# Patient Record
Sex: FEMALE | Race: BLACK OR AFRICAN AMERICAN | NOT HISPANIC OR LATINO | Employment: UNEMPLOYED | URBAN - METROPOLITAN AREA
[De-identification: names, ages, dates, MRNs, and addresses within clinical notes are randomized per-mention and may not be internally consistent; named-entity substitution may affect disease eponyms.]

---

## 2023-02-08 ENCOUNTER — HOSPITAL ENCOUNTER (EMERGENCY)
Facility: CLINIC | Age: 23
Discharge: HOME | End: 2023-02-08
Attending: EMERGENCY MEDICINE | Admitting: EMERGENCY MEDICINE
Payer: COMMERCIAL

## 2023-02-08 ENCOUNTER — NURSE TRIAGE (OUTPATIENT)
Dept: NURSING | Facility: CLINIC | Age: 23
End: 2023-02-08
Payer: COMMERCIAL

## 2023-02-08 VITALS
TEMPERATURE: 98 F | RESPIRATION RATE: 18 BRPM | HEIGHT: 61 IN | DIASTOLIC BLOOD PRESSURE: 56 MMHG | HEART RATE: 68 BPM | SYSTOLIC BLOOD PRESSURE: 91 MMHG | BODY MASS INDEX: 35.3 KG/M2 | OXYGEN SATURATION: 98 % | WEIGHT: 187 LBS

## 2023-02-08 DIAGNOSIS — F10.920 ALCOHOLIC INTOXICATION WITHOUT COMPLICATION (H): ICD-10-CM

## 2023-02-08 DIAGNOSIS — T40.601A NARCOTIC OVERDOSE, ACCIDENTAL OR UNINTENTIONAL, INITIAL ENCOUNTER (H): ICD-10-CM

## 2023-02-08 PROCEDURE — 250N000011 HC RX IP 250 OP 636: Performed by: EMERGENCY MEDICINE

## 2023-02-08 PROCEDURE — 96374 THER/PROPH/DIAG INJ IV PUSH: CPT

## 2023-02-08 PROCEDURE — 99284 EMERGENCY DEPT VISIT MOD MDM: CPT | Mod: 25

## 2023-02-08 RX ORDER — ONDANSETRON 4 MG/1
4 TABLET, ORALLY DISINTEGRATING ORAL ONCE
Status: DISCONTINUED | OUTPATIENT
Start: 2023-02-08 | End: 2023-02-08

## 2023-02-08 RX ORDER — ONDANSETRON 2 MG/ML
4 INJECTION INTRAMUSCULAR; INTRAVENOUS ONCE
Status: COMPLETED | OUTPATIENT
Start: 2023-02-08 | End: 2023-02-08

## 2023-02-08 RX ADMIN — ONDANSETRON 4 MG: 2 INJECTION INTRAMUSCULAR; INTRAVENOUS at 06:22

## 2023-02-08 ASSESSMENT — ACTIVITIES OF DAILY LIVING (ADL)
ADLS_ACUITY_SCORE: 35
ADLS_ACUITY_SCORE: 35

## 2023-02-08 NOTE — ED NOTES
"Pt resting in bed, no signs of distress, VSS. When RN asked pt how she was feeling pt states \"fine. Leave me alone\"  "

## 2023-02-08 NOTE — ED NOTES
Patient was able to ambulate and walk around with no issues or difficulty. States that she does not want to eat right now, did drink some water and juice. Asked if she had a ride home or someone she can call to pick her up and stated no, asked if patient could try otherwise staff can see if we can arrange something.

## 2023-02-08 NOTE — ED NOTES
Bed: ED17  Expected date:   Expected time:   Means of arrival:   Comments:  513 22f overdose NARCAN given

## 2023-02-08 NOTE — ED TRIAGE NOTES
BIBA from [facility] in [place]. Drinking alcohol a pint of paz  and  overdosed on Fentanyl Intra-nasal. Narcan 12 mg given  By PD. /67, Sats, 100% RA. Bg 177. 18G LAC.   Triage Assessment     Row Name 02/08/23 0611       Triage Assessment (Adult)    Airway WDL WDL       Respiratory WDL    Respiratory WDL WDL       Skin Circulation/Temperature WDL    Skin Circulation/Temperature WDL WDL       Cardiac WDL    Cardiac WDL WDL       Peripheral/Neurovascular WDL    Peripheral Neurovascular WDL WDL       Cognitive/Neuro/Behavioral WDL    Cognitive/Neuro/Behavioral WDL X    Row Name 02/08/23 0609       Triage Assessment (Adult)    Airway WDL WDL

## 2023-02-08 NOTE — ED PROVIDER NOTES
"  History     Chief Complaint:  Drug Overdose     HPI   Olga Padilla is a 22 year old female with a history of polysubstance abuse, depression, and anxiety who presents with alcohol intoxication and unintentional fentanyl overdose. Police were called to the [facility] in [place] and administered 12 mg narcan.  She states that she drank 1 pint of Abbey and snorted fentanyl/percocet. She is nauseous here. She has never been in treatment for drug abuse in the past.     Independent Historian:   EMS - They report /67, 100% RA, Blood glucose 177, 18G LAC.     Review of External Notes:   Care everywhere      ROS:  Review of Systems   See HPI    Allergies:  There are no known allergies.      Medications:    There are no current prescribed medications.     Past Medical History:    Anxiety   Depression  Bipolar disorder  Anemia   Tachycardia    Social History:  She reports to the ED alone from [facility] in [place].   PCP: Jean-Pierre Guadarrama     Physical Exam     Patient Vitals for the past 24 hrs:   BP Temp Temp src Pulse Resp SpO2 Height Weight   02/08/23 0955 -- -- -- -- -- 96 % -- --   02/08/23 0945 91/56 -- -- 79 -- 95 % -- --   02/08/23 0915 91/63 -- -- 75 -- 97 % -- --   02/08/23 0903 (!) 86/60 -- -- 74 -- 97 % -- --   02/08/23 0848 90/55 -- -- 76 -- 98 % -- --   02/08/23 0833 98/48 -- -- 81 -- 98 % -- --   02/08/23 0818 96/57 -- -- 80 -- 98 % -- --   02/08/23 0800 92/54 -- -- 76 -- 98 % -- --   02/08/23 0715 108/58 -- -- 85 -- 98 % -- --   02/08/23 0610 125/76 98  F (36.7  C) Temporal 109 15 100 % 1.549 m (5' 1\") 84.8 kg (187 lb)     Physical Exam  General: Patient in mild distress, holding emesis bag.  Alert and cooperative with exam. Normal mentation  HEENT: NC/AT. Conjunctiva without injection or scleral icterus. External ears normal.  Respiratory: Breathing comfortably on room air  CV: Normal rate, all extremities well perfused  GI:  Non-distended abdomen  Skin: Warm, dry, no rashes/open " wounds on exposed skin  Musculoskeletal: No obvious deformities  Neuro: Alert, answers questions appropriately. No gross motor deficits    Emergency Department Course     Emergency Department Course & Assessments:    Interventions:  0622 Zofran 4 mg IV     Social Determinants of Health affecting care:   Polysubstance abuse and underlying mental health conditions.     Assessments:  0611 I obtained history and examined the patient as noted above.   0747 I rechecked the patient and she is sleeping at this time.   0950 I rechecked the patient and explained findings.     Disposition:  The patient was discharged to home.     Impression & Plan      Medical Decision Making:  Olga Padilla is a 22 year old female who presents for evaluation of alcohol intoxication and fentanyl overdose. This is consistent with drug overdose. This is not an intentional overdose and therefore a 72 hour hold was not placed.   A broad workup was considered including sequelae of drug overdose, intentional vs unintentional overdose, volatile alcohol ingestion, ethanol ingestion, encephalitis, meningitis, tylenol or salicylate ingestion, etc.   They are maintaining an airway and vitals are within normal range.  The workup here is negative at this point.  Patient was monitored for an extended period of time with no evidence of decompensation.  Patient ambulatory and clinically sober at time of discharge.  Patient was repeatedly offered and deferred resources for drug/alcohol abuse.  Patient discharged to self-care.  Recommend close follow-up with PCP.  Return precautions discussed.    Diagnosis:    ICD-10-CM    1. Alcoholic intoxication without complication (H)  F10.920       2. Narcotic overdose, accidental or unintentional, initial encounter (H)  T40.601A            Scribe Disclosure:   ZEYNEP CHAVEZ, am serving as a scribe; to document services personally performed by Grupo Gaviria DO based on data collection and the provider's  statements to me.     2/8/2023   Grupo Gaviria DO O'Neill, Christopher Warren, DO  02/08/23 1111

## 2023-02-09 NOTE — TELEPHONE ENCOUNTER
"Sister calling with concern for Olga unable to stay awake but she was not with Olga so we called her with 3 way calling.  Olga sounded alert but complaining of nausea and vomiting.  Vomited 3 times today. She was discharge from the ED this morning for alcohol intoxication and drug overdose. She received several doses of narcan. She complained of chest pressure in the mid chest, worse with movement, rates   pain \"7.5\". She states it may be from chest compressions. She also has a headache rates pain \"4.\"  AVS has instructions to be seen as soon as possible with Dr Guadarrama. Pt agreed to call clinic in am and make an appointment at [facility].   Jennifer Morales RN on 2/8/2023 at 9:42 PM      Reason for Disposition    Vomiting occurs    Followed a chest injury    [1] Chest wall swelling, bruise or pain AND [2] present < 7 days    Substance use (drug use) or unhealthy alcohol use, known or suspected    Additional Information    Negative: Shock suspected (e.g., cold/pale/clammy skin, too weak to stand, low BP, rapid pulse)    Negative: Difficult to awaken or acting confused (e.g., disoriented, slurred speech)    Negative: Sounds like a life-threatening emergency to the triager    Negative: [1] Vomiting AND [2] contains red blood or black (\"coffee ground\") material  (Exception: few red streaks in vomit that only happened once)    Negative: Severe pain in one eye    Negative: Recent head injury (within last 3 days)    Negative: Recent abdominal injury (within last 3 days)    Negative: [1] Insulin-dependent diabetes (Type I) AND [2] glucose > 400 mg/dl (22 mmol/l)    Negative: [1] Vomiting AND [2] hernia is more painful or swollen than usual    Negative: [1] SEVERE vomiting (e.g., 6 or more times/day) AND [2] present > 8 hours (Exception: patient sounds well, is drinking liquids, does not sound dehydrated, and vomiting has lasted less than 24 hours)    Negative: [1] MODERATE vomiting (e.g., 3 - 5 " times/day) AND [2] age > 60 years    Negative: Severe headache (e.g., excruciating) (Exception: similar to previous migraines)    Negative: High-risk adult (e.g., diabetes mellitus, brain tumor, V-P shunt, hernia)    Negative: [1] Drinking very little AND [2] dehydration suspected (e.g., no urine > 12 hours, very dry mouth, very lightheaded)    Negative: Patient sounds very sick or weak to the triager    Negative: [1] Vomiting AND [2] abdomen looks much more swollen than usual    Negative: [1] Vomiting AND [2] contains bile (green color)    Negative: SEVERE difficulty breathing (e.g., struggling for each breath, speaks in single words)    Negative: Difficult to awaken or acting confused (e.g., disoriented, slurred speech)    Negative: Shock suspected (e.g., cold/pale/clammy skin, too weak to stand, low BP, rapid pulse)    Negative: Passed out (i.e., lost consciousness, collapsed and was not responding)    Negative: [1] Chest pain lasts > 5 minutes AND [2] age > 44    Negative: [1] Chest pain lasts > 5 minutes AND [2] age > 30 AND [3] one or more cardiac risk factors (e.g., diabetes, high blood pressure, high cholesterol, smoker, or strong family history of heart disease)    Negative: [1] Chest pain lasts > 5 minutes AND [2] history of heart disease (i.e., angina, heart attack, heart failure, bypass surgery, takes nitroglycerin)    Negative: [1] Chest pain lasts > 5 minutes AND [2] described as crushing, pressure-like, or heavy    Negative: Heart beating < 50 beats per minute OR > 140 beats per minute    Negative: Visible sweat on face or sweat dripping down face    Negative: Sounds like a life-threatening emergency to the triager    Negative: Major injury from dangerous force or speed (e.g., MVA, fall > 10 feet or 3 meters)    Negative: Bullet wound, knife wound, or other penetrating object    Negative: Puncture wound that sounds life-threatening to the triager    Negative: SEVERE difficulty breathing (e.g.,  struggling for each breath, speaks in single words)    Negative: [1] Major bleeding (e.g., actively dripping or spurting) AND [2] can't be stopped    Negative: Open wound of the chest with sound of moving air (sucking wound) or visible air bubbles    Negative: Shock suspected (e.g., cold/pale/clammy skin, too weak to stand, low BP, rapid pulse)    Negative: Coughing or spitting up blood    Negative: Bluish (or gray) lips or face now    Negative: Unconscious or was unconscious    Negative: Sounds like a life-threatening emergency to the triager    Negative: SEVERE chest pain    Negative: [1] Difficulty breathing AND [2] not severe    Negative: Skin is split open or gaping    Negative: [1] Bleeding AND [2] won't stop after 10 minutes of direct pressure (using correct technique)    Negative: Sounds like a serious injury to the triager    Negative: [1] Can't take a deep breath BUT [2] no respiratory distress    Negative: Shallow puncture wound    Negative: [1] Collarbone is painful AND [2] difficulty raising arm    Negative: Suspicious history for the injury    Negative: Patient is confused or is an unreliable provider of information (e.g., dementia, severe intellectual disability, alcohol intoxication)    Negative: [1] High-risk adult (e.g., age > 60 years, osteoporosis, chronic steroid use) AND [2] still hurts    Negative: [1] No prior tetanus shots (or is not fully vaccinated) AND [2] any wound (e.g., cut, scrape)    Negative: [1] HIV positive or severe immunodeficiency (severely weak immune system) AND [2] DIRTY cut or scrape    Negative: [1] Last tetanus shot > 5 years ago AND [2] DIRTY cut or scrape    Negative: [1] Last tetanus shot > 10 years ago AND [2] CLEAN cut or scrape (e.g., object AND skin were clean)    Negative: [1] After 72 hours AND [2] chest pain not improving    Negative: [1] Chest wall swelling or pain AND [2] present > 7 days    Negative: Large swelling or bruise > 2 inches (5 cm)    Negative: [1]  Constant abdominal pain AND [2] present > 2 hours    Negative: [1] Fever > 103 F (39.4 C) AND [2] not able to get the fever down using Fever Care Advice    Negative: [1] Fever > 101 F (38.3 C) AND [2] age > 60 years    Negative: [1] Fever > 100.0 F (37.8 C) AND [2] bedridden (e.g., nursing home patient, CVA, chronic illness, recovering from surgery)    Negative: [1] Fever > 100.0 F (37.8 C) AND [2] weak immune system (e.g., HIV positive, cancer chemo, splenectomy, organ transplant, chronic steroids)    Negative: Taking any of the following medications: digoxin (Lanoxin), lithium, theophylline, phenytoin (Dilantin)    Negative: [1] MILD or MODERATE vomiting AND [2] present > 48 hours (2 days) (Exception: mild vomiting with associated diarrhea)    Negative: Fever present > 3 days (72 hours)    Negative: Vomiting a prescription medication    Negative: [1] MILD vomiting with diarrhea AND [2] present > 5 days    Protocols used: NAUSEA-A-AH, CHEST PAIN-A-AH, CHEST INJURY-A-AH, VOMITING-A-AH